# Patient Record
Sex: MALE | Race: WHITE | Employment: UNEMPLOYED | URBAN - METROPOLITAN AREA
[De-identification: names, ages, dates, MRNs, and addresses within clinical notes are randomized per-mention and may not be internally consistent; named-entity substitution may affect disease eponyms.]

---

## 2017-02-16 ENCOUNTER — OFFICE VISIT (OUTPATIENT)
Dept: PULMONOLOGY | Age: 42
End: 2017-02-16

## 2017-02-16 VITALS
RESPIRATION RATE: 20 BRPM | BODY MASS INDEX: 43.81 KG/M2 | OXYGEN SATURATION: 96 % | TEMPERATURE: 98.1 F | DIASTOLIC BLOOD PRESSURE: 88 MMHG | HEIGHT: 70 IN | HEART RATE: 99 BPM | SYSTOLIC BLOOD PRESSURE: 134 MMHG | WEIGHT: 306 LBS

## 2017-02-16 DIAGNOSIS — R60.0 BILATERAL EDEMA OF LOWER EXTREMITY: ICD-10-CM

## 2017-02-16 DIAGNOSIS — R05.9 COUGH: ICD-10-CM

## 2017-02-16 DIAGNOSIS — J45.31 MILD PERSISTENT ASTHMA WITH ACUTE EXACERBATION: Primary | ICD-10-CM

## 2017-02-16 PROCEDURE — 99214 OFFICE O/P EST MOD 30 MIN: CPT | Performed by: INTERNAL MEDICINE

## 2017-02-16 RX ORDER — DOXYCYCLINE HYCLATE 100 MG/1
100 CAPSULE ORAL 2 TIMES DAILY
COMMUNITY

## 2017-02-16 RX ORDER — PREDNISONE 10 MG/1
10 TABLET ORAL DAILY
COMMUNITY

## 2017-02-16 RX ORDER — PREDNISONE 20 MG/1
TABLET ORAL
Qty: 20 TABLET | Refills: 0 | Status: SHIPPED | OUTPATIENT
Start: 2017-02-16

## 2017-02-16 RX ORDER — BUDESONIDE AND FORMOTEROL FUMARATE DIHYDRATE 160; 4.5 UG/1; UG/1
2 AEROSOL RESPIRATORY (INHALATION) 2 TIMES DAILY
Qty: 2 INHALER | Refills: 2 | Status: SHIPPED | OUTPATIENT
Start: 2017-02-16 | End: 2017-03-09 | Stop reason: SDUPTHER

## 2017-02-16 RX ORDER — BUDESONIDE AND FORMOTEROL FUMARATE DIHYDRATE 160; 4.5 UG/1; UG/1
2 AEROSOL RESPIRATORY (INHALATION) 2 TIMES DAILY
Qty: 3 INHALER | Refills: 2 | Status: CANCELLED | OUTPATIENT
Start: 2017-02-16

## 2017-02-20 ENCOUNTER — HOSPITAL ENCOUNTER (OUTPATIENT)
Dept: NON INVASIVE DIAGNOSTICS | Age: 42
Discharge: HOME | End: 2017-02-20
Attending: INTERNAL MEDICINE | Admitting: INTERNAL MEDICINE

## 2017-02-20 DIAGNOSIS — R60.0 LOCALIZED EDEMA: ICD-10-CM

## 2017-02-20 DIAGNOSIS — R60.0 BILATERAL EDEMA OF LOWER EXTREMITY: ICD-10-CM

## 2017-02-20 LAB
LV EF: 53 %
LVEF MODALITY: NORMAL

## 2021-02-04 ENCOUNTER — HOSPITAL ENCOUNTER (OUTPATIENT)
Dept: ULTRASOUND IMAGING | Age: 46
Discharge: HOME | End: 2021-02-04
Payer: COMMERCIAL

## 2021-02-04 DIAGNOSIS — N45.1 LEFT EPIDIDYMITIS: ICD-10-CM

## 2021-02-04 DIAGNOSIS — N50.82 SCROTAL PAIN: ICD-10-CM

## 2021-02-04 PROCEDURE — 76870 US EXAM SCROTUM: CPT

## 2025-05-10 ENCOUNTER — OFFICE VISIT (OUTPATIENT)
Age: 50
End: 2025-05-10

## 2025-05-10 VITALS
TEMPERATURE: 98.1 F | HEART RATE: 67 BPM | DIASTOLIC BLOOD PRESSURE: 78 MMHG | HEIGHT: 70 IN | BODY MASS INDEX: 33.21 KG/M2 | WEIGHT: 232 LBS | RESPIRATION RATE: 18 BRPM | OXYGEN SATURATION: 93 % | SYSTOLIC BLOOD PRESSURE: 115 MMHG

## 2025-05-10 DIAGNOSIS — M54.50 ACUTE RIGHT-SIDED LOW BACK PAIN, UNSPECIFIED WHETHER SCIATICA PRESENT: Primary | ICD-10-CM

## 2025-05-10 ASSESSMENT — ENCOUNTER SYMPTOMS
BACK PAIN: 1
NAUSEA: 0
DIARRHEA: 0
ABDOMINAL PAIN: 0
CHEST TIGHTNESS: 0
SHORTNESS OF BREATH: 0
CONSTIPATION: 0
VOMITING: 0
COUGH: 0

## 2025-05-10 NOTE — PROGRESS NOTES
Colton Calderon (:  1975) is a 49 y.o. male,New patient, here for evaluation of the following chief complaint(s):  Back Pain (Pt states hurt his back last Saturday , hasn't been able to stand straight since , not at bad as it was just wants to check on it )      ASSESSMENT/PLAN:    ICD-10-CM    1. Acute right-sided low back pain, unspecified whether sciatica present  M54.50           Patient presents for right sided back pain ongoing since about Saturday.   On exam no gross obvious deformity.   DDX: Musculoskeletal strain versus pinched nerve   Patient advised to continue naproxen.  Follow-up with PCP if symptoms persist.  Patient given work note.  Please take naproxen for symptoms.   Please follow up with PCP if symptoms persist.     SUBJECTIVE/OBJECTIVE:    History provided by:  Patient   used: No    Back Pain  Pertinent negatives include no abdominal pain, chest pain or fever.     HPI:   49 y.o. male presents with symptoms of lower back pain ongoing since about Saturday. He states that he went to try and pull the cover and hurt his back. He states that he could not go to work on Saturday. He states that he has been off for a week since then. He states that today is the first day that he can stand up. Right lower back pain. He has taken ibuprofen and naproxen for symptoms.   Patient denies alcohol abuse, diabetes mellitus, renal failure, night pain, IV drug use, no fevers, no signs of recurrent or systemic infection, not immunosuppressed, denies recent spinal fractures or procedures, no urinary incontinence or urinary retention no indwelling catheter.      Vitals:    05/10/25 1208   BP: 115/78   BP Site: Left Upper Arm   Patient Position: Sitting   BP Cuff Size: Medium Adult   Pulse: 67   Resp: 18   Temp: 98.1 °F (36.7 °C)   TempSrc: Oral   SpO2: 93%   Weight: 105.2 kg (232 lb)   Height: 1.765 m (5' 9.5\")       Review of Systems   Constitutional:  Negative for fatigue and fever.